# Patient Record
Sex: FEMALE | Race: BLACK OR AFRICAN AMERICAN | NOT HISPANIC OR LATINO | URBAN - METROPOLITAN AREA
[De-identification: names, ages, dates, MRNs, and addresses within clinical notes are randomized per-mention and may not be internally consistent; named-entity substitution may affect disease eponyms.]

---

## 2022-09-28 ENCOUNTER — OFFICE VISIT (OUTPATIENT)
Dept: URBAN - METROPOLITAN AREA CLINIC 40 | Facility: CLINIC | Age: 42
End: 2022-09-28
Payer: COMMERCIAL

## 2022-09-28 ENCOUNTER — WEB ENCOUNTER (OUTPATIENT)
Dept: URBAN - METROPOLITAN AREA CLINIC 40 | Facility: CLINIC | Age: 42
End: 2022-09-28

## 2022-09-28 VITALS
WEIGHT: 179 LBS | SYSTOLIC BLOOD PRESSURE: 120 MMHG | DIASTOLIC BLOOD PRESSURE: 80 MMHG | TEMPERATURE: 97.8 F | HEART RATE: 73 BPM | BODY MASS INDEX: 30.56 KG/M2 | HEIGHT: 64 IN

## 2022-09-28 DIAGNOSIS — R10.32 LLQ ABDOMINAL PAIN: ICD-10-CM

## 2022-09-28 DIAGNOSIS — Z87.898 HISTORY OF ABDOMINAL PAIN: ICD-10-CM

## 2022-09-28 PROCEDURE — 99203 OFFICE O/P NEW LOW 30 MIN: CPT | Performed by: PHYSICIAN ASSISTANT

## 2022-09-28 RX ORDER — DICYCLOMINE HYDROCHLORIDE 10 MG/1
1 TABLET CAPSULE ORAL
Qty: 90 CAPSULE | Refills: 1 | OUTPATIENT
Start: 2022-09-28 | End: 2022-11-26

## 2022-09-28 NOTE — HPI-TODAY'S VISIT:
Ms. Sanchez is a 42 year old Black female who presents to the office today with complaint of chronic intermittent left lower quadrant abdominal pain that improves with spontaneous bowel movement.  However, she denies constipation, reported consistency stool daily without any hematochezia or melena.  The abdominal pain is not associated with any movement per patient or meals, eating.  She had pain in this area when seen by Dr. Bravo years ago and a normal colonoscopy for evaluation of this in 2012.  She has not tried any medication for it.  Does not report any use of regular NSAIDs, recently stopped progesterone supplement along with her HRT as it was causing GI upset. UTD on pelvic exams, no longer having menstrual periods. No nausea, vomiting or dysphagia reported.  Overall good appetite.  That she has gained weight and not been exercising as much recently.  She tries eat a balanced diet.  Admits that years ago she tried a vegetarian diet that caused heartburn.  No family history given of colon cancer.  Rare alcohol.  Non-smoker.

## 2022-10-14 ENCOUNTER — TELEPHONE ENCOUNTER (OUTPATIENT)
Dept: URBAN - METROPOLITAN AREA CLINIC 40 | Facility: CLINIC | Age: 42
End: 2022-10-14

## 2022-11-08 ENCOUNTER — OFFICE VISIT (OUTPATIENT)
Dept: URBAN - METROPOLITAN AREA CLINIC 40 | Facility: CLINIC | Age: 42
End: 2022-11-08
Payer: COMMERCIAL

## 2022-11-08 VITALS
BODY MASS INDEX: 30.9 KG/M2 | TEMPERATURE: 97.2 F | SYSTOLIC BLOOD PRESSURE: 144 MMHG | HEIGHT: 64 IN | WEIGHT: 181 LBS | DIASTOLIC BLOOD PRESSURE: 84 MMHG | HEART RATE: 82 BPM

## 2022-11-08 DIAGNOSIS — K57.30 DIVERTICULA OF COLON: ICD-10-CM

## 2022-11-08 DIAGNOSIS — R10.32 LLQ ABDOMINAL PAIN: ICD-10-CM

## 2022-11-08 DIAGNOSIS — Z87.898 HISTORY OF ABDOMINAL PAIN: ICD-10-CM

## 2022-11-08 PROBLEM — 398050005 DIVERTICULAR DISEASE OF COLON: Status: ACTIVE | Noted: 2022-11-08

## 2022-11-08 PROCEDURE — 99213 OFFICE O/P EST LOW 20 MIN: CPT | Performed by: PHYSICIAN ASSISTANT

## 2022-11-08 RX ORDER — DICYCLOMINE HYDROCHLORIDE 10 MG/1
1 TABLET CAPSULE ORAL
Qty: 90 CAPSULE | Refills: 1
Start: 2022-09-28 | End: 2023-01-07

## 2022-11-08 RX ORDER — DICYCLOMINE HYDROCHLORIDE 10 MG/1
1 TABLET CAPSULE ORAL
Qty: 90 CAPSULE | Refills: 1 | Status: ACTIVE | COMMUNITY
Start: 2022-09-28 | End: 2022-11-26

## 2022-11-08 RX ORDER — BISACODYL 5 MG
1 TABLET AS NEEDED TABLET, DELAYED RELEASE (ENTERIC COATED) ORAL ONCE A DAY
Status: ACTIVE | COMMUNITY

## 2022-11-08 NOTE — HPI-TODAY'S VISIT:
Ms. Sanchez is a 42 year old Black female seen 9/28/22, with complaint of chronic intermittent left lower quadrant abdominal pain that improves with spontaneous bowel movement.  However, she denies constipation, reported consistency stool daily without any hematochezia or melena.  The abdominal pain is not associated with any movement per patient or meals, eating.  She had pain in this area when seen by Dr. Bravo years ago and a normal colonoscopy for evaluation of this in 2012.  She has not tried any medication for it.  Does not report any use of regular NSAIDs, recently stopped progesterone supplement along with her HRT as it was causing GI upset. UTD on pelvic exams, no longer having menstrual periods. No nausea, vomiting or dysphagia reported.  Overall good appetite.  That she has gained weight and not been exercising as much recently.  She tries eat a balanced diet.  Admits that years ago she tried a vegetarian diet that caused heartburn.  No family history given of colon cancer.  Rare alcohol.  Non-smoker. She did have a CT abdomen and pelvis with IV contrast on October 17, 2022.  No acute GI findings noted.  Normal liver, gallbladder, pancreas, spleen, stomach and small bowel.  There was colonic diverticulosis without diverticulitis.  Normal appendix.  No evidence of lymphadenopathy.  Trace, likely physiologic free pelvic fluid.  Otherwise normal imaging. Today, she admits to being somewhat compliant with dicyclomine, but oftentimes misses doses.  She is planning to increase her dietary fiber, once again become vegetarian.  Also, she is planning to join the gym again and exercise more regularly.  She has been taking IBgard and believes it may be helping somewhat.

## 2023-03-08 ENCOUNTER — OFFICE VISIT (OUTPATIENT)
Dept: URBAN - METROPOLITAN AREA CLINIC 40 | Facility: CLINIC | Age: 43
End: 2023-03-08

## 2023-04-03 ENCOUNTER — OFFICE VISIT (OUTPATIENT)
Dept: URBAN - METROPOLITAN AREA CLINIC 40 | Facility: CLINIC | Age: 43
End: 2023-04-03
Payer: COMMERCIAL

## 2023-04-03 VITALS
SYSTOLIC BLOOD PRESSURE: 130 MMHG | HEART RATE: 78 BPM | BODY MASS INDEX: 29.71 KG/M2 | DIASTOLIC BLOOD PRESSURE: 90 MMHG | HEIGHT: 64 IN | WEIGHT: 174 LBS

## 2023-04-03 DIAGNOSIS — R10.32 LLQ ABDOMINAL PAIN: ICD-10-CM

## 2023-04-03 DIAGNOSIS — Z87.898 HISTORY OF ABDOMINAL PAIN: ICD-10-CM

## 2023-04-03 DIAGNOSIS — K57.30 DIVERTICULA OF COLON: ICD-10-CM

## 2023-04-03 DIAGNOSIS — K59.09 CHRONIC CONSTIPATION: ICD-10-CM

## 2023-04-03 DIAGNOSIS — R10.9 CHRONIC ABDOMINAL PAIN: ICD-10-CM

## 2023-04-03 PROCEDURE — 99214 OFFICE O/P EST MOD 30 MIN: CPT | Performed by: PHYSICIAN ASSISTANT

## 2023-04-03 RX ORDER — DICYCLOMINE HYDROCHLORIDE 10 MG/1
1 TABLET CAPSULE ORAL
Qty: 90 CAPSULE | Refills: 1
Start: 2022-09-28 | End: 2023-06-02

## 2023-04-03 RX ORDER — BISACODYL 5 MG
1 TABLET AS NEEDED TABLET, DELAYED RELEASE (ENTERIC COATED) ORAL ONCE A DAY
Status: DISCONTINUED | COMMUNITY

## 2023-04-03 NOTE — PHYSICAL EXAM PSYCH:
[facility]  [address]  [place] [address]  Phone: [phone]  Fax: [phone]    December 20, 2021        Ita Poole  [address] [place] [id number]          To whom it may concern:    RE: Ita Poole    Patient was seen and tested today for covid at our clinic and missed work.    Patient must remain off work until covid results return, symptoms improving, and fever free for 24 hours.      Please contact me for questions or concerns.      Sincerely,        Negra Arvizu NP   normal mood with appropriate affect

## 2023-04-03 NOTE — HPI-TODAY'S VISIT:
Ms. Sanchez is a 43 year old Black female seen 11/8/22, with complaint of chronic intermittent left lower quadrant abdominal pain that improves with spontaneous bowel movement.  However, she denies constipation, reported consistency stool daily without any hematochezia or melena.  The abdominal pain is not associated with any movement per patient or meals, eating.  She had pain in this area when seen by Dr. Bravo years ago and a normal colonoscopy for evaluation of this in 2012.  She had not tried any medication for it.  Does not report any use of regular NSAIDs, recently stopped progesterone supplement along with her HRT as it was causing GI upset. UTD on pelvic exams, no longer having menstrual periods. No nausea, vomiting or dysphagia reported.  Overall good appetite.  That she has gained weight and not been exercising as much recently.  She tries eat a balanced diet.  Admits that years ago she tried a vegetarian diet that caused heartburn.  No family history given of colon cancer.  Rare alcohol.  Non-smoker. She did have a CT abdomen and pelvis with IV contrast on October 17, 2022.  No acute GI findings noted.  Normal liver, gallbladder, pancreas, spleen, stomach and small bowel.  There was colonic diverticulosis without diverticulitis.  Normal appendix.  No evidence of lymphadenopathy.  Trace, likely physiologic free pelvic fluid.  Otherwise normal imaging. Today, she admits to being somewhat compliant with dicyclomine, but oftentimes misses doses. Takes once daily.  She is planning to increase her dietary fiber.  Also, she is planning to join the gym again and exercise more regularly.  She took IBgard and believes it helped somewhat. Admits that her discomfort still persists, but she is aware this may be due to her inconsistent use of Bentyl and IBgard supplement.  She has also had increased stress recently due to her good friend of 39 years of age being diagnosed with colorectal cancer.  No new complaints today.

## 2023-05-23 ENCOUNTER — DASHBOARD ENCOUNTERS (OUTPATIENT)
Age: 43
End: 2023-05-23

## 2023-05-30 ENCOUNTER — OFFICE VISIT (OUTPATIENT)
Dept: URBAN - METROPOLITAN AREA CLINIC 40 | Facility: CLINIC | Age: 43
End: 2023-05-30

## 2023-05-30 RX ORDER — DICYCLOMINE HYDROCHLORIDE 10 MG/1
1 TABLET CAPSULE ORAL
Qty: 90 CAPSULE | Refills: 1 | Status: ACTIVE | COMMUNITY
Start: 2022-09-28 | End: 2023-06-02

## 2023-11-19 NOTE — PHYSICAL EXAM PSYCH:
normal mood with appropriate affect  Scribe Attestation (For Scribes USE Only)... Attending Attestation (For Attendings USE Only).../Scribe Attestation (For Scribes USE Only)...